# Patient Record
Sex: MALE | NOT HISPANIC OR LATINO | URBAN - METROPOLITAN AREA
[De-identification: names, ages, dates, MRNs, and addresses within clinical notes are randomized per-mention and may not be internally consistent; named-entity substitution may affect disease eponyms.]

---

## 2022-05-31 ENCOUNTER — OFFICE (OUTPATIENT)
Dept: URBAN - METROPOLITAN AREA CLINIC 72 | Facility: CLINIC | Age: 27
End: 2022-05-31
Payer: SELF-PAY

## 2022-05-31 VITALS
HEIGHT: 68 IN | SYSTOLIC BLOOD PRESSURE: 138 MMHG | WEIGHT: 191 LBS | HEART RATE: 62 BPM | DIASTOLIC BLOOD PRESSURE: 82 MMHG

## 2022-05-31 DIAGNOSIS — K60.2 ANAL FISSURE, UNSPECIFIED: ICD-10-CM

## 2022-05-31 DIAGNOSIS — A04.8 OTHER SPECIFIED BACTERIAL INTESTINAL INFECTIONS: ICD-10-CM

## 2022-05-31 DIAGNOSIS — R74.01 ELEVATION OF LEVELS OF LIVER TRANSAMINASE LEVELS: ICD-10-CM

## 2022-05-31 DIAGNOSIS — L29.0 PRURITUS ANI: ICD-10-CM

## 2022-05-31 DIAGNOSIS — R10.13 EPIGASTRIC PAIN: ICD-10-CM

## 2022-05-31 DIAGNOSIS — K64.8 OTHER HEMORRHOIDS: ICD-10-CM

## 2022-05-31 PROCEDURE — 99204 OFFICE O/P NEW MOD 45 MIN: CPT | Performed by: NURSE PRACTITIONER

## 2022-05-31 RX ORDER — HYDROCORTISONE ACETATE PRAMOXINE HCL 2.5; 1 G/100G; G/100G
CREAM TOPICAL
Qty: 30 | Refills: 1 | Status: ACTIVE
Start: 2022-05-31

## 2022-05-31 RX ORDER — PANTOPRAZOLE SODIUM 40 MG/1
TABLET, DELAYED RELEASE ORAL
Qty: 30 | Refills: 2 | Status: ACTIVE
Start: 2022-05-31

## 2022-05-31 NOTE — SERVICEHPINOTES
Kayden Cortes   is seen for an initial visit today.     Pleasant 27-year-old referred for positive H. pylori stool antigen.  He does not speak English.  An  service was used.  He complains of epigastric burning for several months.  Stool H. pylori antigen was positive.  He states he didn't complete the treatment but does not recall the medications that was given.  Is also on pantoprazole 20 mg daily for one month.  This does help the epigastric burning.  He denies nausea, vomiting, acid regurgitation, weight loss, diarrhea, constipation.  He reports 2-3 episodes of bright red blood in the stool earlier this year.  He has had no signs of blood since 2/2022.  He does complain of rectal itching and burning which began 11/2021 and continues daily.  He was given suppositories which helped only a little.br
br   Labs
br 2/2022–H. pylori antigen–positive
br 2/2022–CBC normal, CMP normal except ALT 70, cholesterol 216, triglycerides 3:30